# Patient Record
Sex: FEMALE | Race: WHITE | Employment: FULL TIME | URBAN - METROPOLITAN AREA
[De-identification: names, ages, dates, MRNs, and addresses within clinical notes are randomized per-mention and may not be internally consistent; named-entity substitution may affect disease eponyms.]

---

## 2017-04-24 ENCOUNTER — COMMUNICATION - HEALTHEAST (OUTPATIENT)
Dept: FAMILY MEDICINE | Facility: CLINIC | Age: 27
End: 2017-04-24

## 2017-04-24 ENCOUNTER — OFFICE VISIT - HEALTHEAST (OUTPATIENT)
Dept: FAMILY MEDICINE | Facility: CLINIC | Age: 27
End: 2017-04-24

## 2017-04-24 DIAGNOSIS — G43.009 MIGRAINE HEADACHE WITHOUT AURA: ICD-10-CM

## 2017-04-24 DIAGNOSIS — F32.9 MAJOR DEPRESSION: ICD-10-CM

## 2017-04-24 DIAGNOSIS — G90.511 COMPLEX REGIONAL PAIN SYNDROME TYPE 1 OF RIGHT UPPER EXTREMITY: ICD-10-CM

## 2017-04-24 ASSESSMENT — MIFFLIN-ST. JEOR: SCORE: 1587.33

## 2017-04-27 ENCOUNTER — COMMUNICATION - HEALTHEAST (OUTPATIENT)
Dept: FAMILY MEDICINE | Facility: CLINIC | Age: 27
End: 2017-04-27

## 2017-05-01 ENCOUNTER — COMMUNICATION - HEALTHEAST (OUTPATIENT)
Dept: FAMILY MEDICINE | Facility: CLINIC | Age: 27
End: 2017-05-01

## 2017-07-18 ENCOUNTER — COMMUNICATION - HEALTHEAST (OUTPATIENT)
Dept: FAMILY MEDICINE | Facility: CLINIC | Age: 27
End: 2017-07-18

## 2017-07-18 DIAGNOSIS — F41.9 ANXIETY AND DEPRESSION: ICD-10-CM

## 2017-07-18 DIAGNOSIS — F32.A ANXIETY AND DEPRESSION: ICD-10-CM

## 2017-07-20 ENCOUNTER — COMMUNICATION - HEALTHEAST (OUTPATIENT)
Dept: FAMILY MEDICINE | Facility: CLINIC | Age: 27
End: 2017-07-20

## 2017-08-21 ENCOUNTER — OFFICE VISIT - HEALTHEAST (OUTPATIENT)
Dept: FAMILY MEDICINE | Facility: CLINIC | Age: 27
End: 2017-08-21

## 2017-08-21 DIAGNOSIS — R19.7 DIARRHEA: ICD-10-CM

## 2017-08-21 DIAGNOSIS — G90.511 COMPLEX REGIONAL PAIN SYNDROME TYPE 1 OF RIGHT UPPER EXTREMITY: ICD-10-CM

## 2017-08-21 DIAGNOSIS — G43.009 MIGRAINE HEADACHE WITHOUT AURA: ICD-10-CM

## 2017-08-21 ASSESSMENT — MIFFLIN-ST. JEOR: SCORE: 1522.97

## 2017-08-24 ENCOUNTER — AMBULATORY - HEALTHEAST (OUTPATIENT)
Dept: LAB | Facility: CLINIC | Age: 27
End: 2017-08-24

## 2017-08-24 ENCOUNTER — COMMUNICATION - HEALTHEAST (OUTPATIENT)
Dept: FAMILY MEDICINE | Facility: CLINIC | Age: 27
End: 2017-08-24

## 2017-08-24 DIAGNOSIS — R19.7 DIARRHEA: ICD-10-CM

## 2017-11-23 ENCOUNTER — COMMUNICATION - HEALTHEAST (OUTPATIENT)
Dept: FAMILY MEDICINE | Facility: CLINIC | Age: 27
End: 2017-11-23

## 2017-11-23 ENCOUNTER — COMMUNICATION - HEALTHEAST (OUTPATIENT)
Dept: SCHEDULING | Facility: CLINIC | Age: 27
End: 2017-11-23

## 2017-11-23 DIAGNOSIS — F41.9 ANXIETY AND DEPRESSION: ICD-10-CM

## 2017-11-23 DIAGNOSIS — F32.A ANXIETY AND DEPRESSION: ICD-10-CM

## 2017-11-27 ENCOUNTER — COMMUNICATION - HEALTHEAST (OUTPATIENT)
Dept: FAMILY MEDICINE | Facility: CLINIC | Age: 27
End: 2017-11-27

## 2017-11-27 DIAGNOSIS — F32.A ANXIETY AND DEPRESSION: ICD-10-CM

## 2017-11-27 DIAGNOSIS — F41.9 ANXIETY AND DEPRESSION: ICD-10-CM

## 2018-08-20 ENCOUNTER — COMMUNICATION - HEALTHEAST (OUTPATIENT)
Dept: FAMILY MEDICINE | Facility: CLINIC | Age: 28
End: 2018-08-20

## 2018-08-20 DIAGNOSIS — G43.009 MIGRAINE WITHOUT AURA AND WITHOUT STATUS MIGRAINOSUS, NOT INTRACTABLE: ICD-10-CM

## 2018-08-23 ENCOUNTER — OFFICE VISIT - HEALTHEAST (OUTPATIENT)
Dept: FAMILY MEDICINE | Facility: CLINIC | Age: 28
End: 2018-08-23

## 2018-08-23 ENCOUNTER — COMMUNICATION - HEALTHEAST (OUTPATIENT)
Dept: SCHEDULING | Facility: CLINIC | Age: 28
End: 2018-08-23

## 2018-08-23 DIAGNOSIS — F32.A ANXIETY AND DEPRESSION: ICD-10-CM

## 2018-08-23 DIAGNOSIS — G43.009 MIGRAINE WITHOUT AURA AND WITHOUT STATUS MIGRAINOSUS, NOT INTRACTABLE: ICD-10-CM

## 2018-08-23 DIAGNOSIS — F41.9 ANXIETY AND DEPRESSION: ICD-10-CM

## 2018-08-23 DIAGNOSIS — G90.511 COMPLEX REGIONAL PAIN SYNDROME TYPE 1 OF RIGHT UPPER EXTREMITY: ICD-10-CM

## 2018-08-23 ASSESSMENT — MIFFLIN-ST. JEOR: SCORE: 1553.02

## 2018-12-14 ENCOUNTER — COMMUNICATION - HEALTHEAST (OUTPATIENT)
Dept: FAMILY MEDICINE | Facility: CLINIC | Age: 28
End: 2018-12-14

## 2018-12-14 DIAGNOSIS — F41.9 ANXIETY AND DEPRESSION: ICD-10-CM

## 2018-12-14 DIAGNOSIS — F32.A ANXIETY AND DEPRESSION: ICD-10-CM

## 2019-02-11 ENCOUNTER — COMMUNICATION - HEALTHEAST (OUTPATIENT)
Dept: FAMILY MEDICINE | Facility: CLINIC | Age: 29
End: 2019-02-11

## 2021-05-30 ENCOUNTER — RECORDS - HEALTHEAST (OUTPATIENT)
Dept: ADMINISTRATIVE | Facility: CLINIC | Age: 31
End: 2021-05-30

## 2021-05-30 VITALS — BODY MASS INDEX: 35 KG/M2 | WEIGHT: 197.56 LBS | HEIGHT: 63 IN

## 2021-05-31 VITALS — HEIGHT: 63 IN | BODY MASS INDEX: 32.49 KG/M2 | WEIGHT: 183.38 LBS

## 2021-06-01 VITALS — HEIGHT: 63 IN | WEIGHT: 190 LBS | BODY MASS INDEX: 33.66 KG/M2

## 2021-06-02 ENCOUNTER — RECORDS - HEALTHEAST (OUTPATIENT)
Dept: ADMINISTRATIVE | Facility: CLINIC | Age: 31
End: 2021-06-02

## 2021-06-10 NOTE — PROGRESS NOTES
OV    4/24/2017  Assessment:     1. Major depression     2. RSD upper limb, right     3. Migraine headache without aura        Plan:      We reviewed the etiology and natural history of depression/anxiety and options for treatment. She would like to resume  buproprion 150 mg as tolerated, and I also sent a new rx for 300 mg that she can take with her on her return to [facility]. We reviewed the potential side effects and indications for urgent evaluation. She will let me know if she has any significant side effects or concerns. I will also refill her tylenol #3, lidoderm patches and imitrex tabs. She apparently needs letters for her Rx's to bring to [facility]. She should f/u in 4-6 mos for recheck otherwise.    Subjective:         Flori Young is an 26 y.o. female who presents for follow up of of depression and anxiety. She has a history of RSD and depression, and stopped her wellbutrin and gabapentin a few mos ago. She has been attending [employer] School in [facility]. Her current symptoms began approximately 2 months ago, and symptoms have gradually worsened since that time.      Current symptoms include: anhedonia, insomnia, fatigue, feelings of worthlessness/guilt, difficulty concentrating and hopelessness.  Patient denies recurrent thoughts of death and suicidal thoughts with specific plan and denies panic attacks.      Current treatment for depression: None. She complains of the following side effects from the treatment: none. 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 Previous treatment modalities employed include Individual therapy and Medication.      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 [facility] for [employer].      She would like to resume Wellbutrin, tylenol with codeine, lidoderm patches, imitrex.    Little interest or pleasure in doing things: More than half the days  Feeling down, depressed, or hopeless:  "Several days  Trouble falling or staying asleep, or sleeping too much: Nearly every day  Feeling tired or having little energy: Nearly every day  Poor appetite or overeating: More than half the days  Feeling bad about yourself - or that you are a failure or have let yourself or your family down: More than half the days  Trouble concentrating on things, such as reading the newspaper or watching television: Several days  Moving or speaking so slowly that other people could have noticed. Or the opposite - being so fidgety or restless that you have been moving around a lot more than usual: Several days  Thoughts that you would be better off dead, or of hurting yourself in some way: More than half the days  PHQ-9 Total Score: 17  If you checked off any problems, how difficult have these problems made it for you to do your work, take care of things at home, or get along with other people?: Very difficult        Review of Systems  Pertinent items are noted in HPI.     Objective:     /72  Pulse 72  Ht 5' 2.5\" (1.588 m)  Wt 197 lb 9 oz (89.6 kg)  BMI 35.56 kg/m2  Physical Exam:  GEN: Alert and oriented, NAD,  well nourished  SKIN:  Normal skin turgor, no lesions/rashes   HEENT: moist mucous membranes, no rhinorrhea.    NECK: Normal.  No adenopathy or thyromegaly.  CV: Regular rate and rhythm, no murmurs.   LUNGS: Clear to auscultation bilaterally.    ABDOMEN: Soft, non-tender, non-distended, no masses   EXTREMITY: No edema, cyanosis  NEURO: Grossly normal.     Mental Status Examination:  Dress, grooming, personal hygiene: Appropriate  Speech: Appropriate  Mood: Appropriate           "

## 2021-06-12 NOTE — PROGRESS NOTES
Assessment:        1. 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 Migraine headache without aura  ibuprofen (ADVIL,MOTRIN) 400 MG tablet    tiZANidine (ZANAFLEX) 2 MG tablet   4. Diarrhea  Ova and Parasite, Stool    C. difficile Toxigenic by PCR    Culture, Stool        Plan:         We reviewed the treatment options for her shoulder symptoms and we will continue her same medications at this time, with the addition of tizanidine as needed. We reviewed potential side effects at length, including possible sedation, dizziness, etc, and she will let me know if she has any significant difficulties. We can send a new Rx for cyclobenzaprine if needed. As far as her recent diarrhea, we will send stool studies as noted and contact her with those results. She will otherwise call or return to clinic with any ongoing or worsening symptoms, and f/u in  4-6 mos for routine med check/evaluation.     Subjective:            Flori Young is a 26 y.o. female who presents for follow up of right shoulder pain and complex regional pain syndrome following multiple surgeries for Erb's Palsy. She has had increased pain recently with some radiation of pain down the ulnar distribution and into the right thumb. She notes that when pain is particularly bad, she experiences bruising in the axilla. Pain is in the right shoulder and trapezius area. She has been taking tylenol #3 a couple times weekly, and she uses a TENS unit intermittently. She also reports more GI symptoms recently with nausea and diarrhea and constipation alternating. She denies any vomiting or fever. She's previously been on gabapentin and cymbalta but got depressed with the gabapentin. She also had issues with seratonin syndrome in the past so is reluctant to take too many things at once. She has been on cyclobenzaprine in the  "past but it does cause some sedation. She is doing fairly well on wellbutrin currently for mood. She has migraines every few weeks.          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 She tries to get home every semester.     Review of Systems  Pertinent items are noted in HPI.     Objective:     /76  Pulse 64  Ht 5' 2.5\" (1.588 m)  Wt 183 lb 6 oz (83.2 kg)  BMI 33.01 kg/m2  GEN: Alert and oriented, NAD, well nourished  SKIN:  Normal skin turgor, no lesions/rashes   HEENT: NC/AT, moist mucous membranes, no rhinorrhea.    NECK: Normal.  No adenopathy or thyromegaly.  CV: Regular rate and rhythm, no murmurs.   LUNGS: Clear to auscultation bilaterally.    BACK: Normal  EXTREMITY: No edema, cyanosis  NEURO: Grossly normal.      "

## 2021-06-20 NOTE — PROGRESS NOTES
Assessment:     1. Anxiety and depression  buPROPion (WELLBUTRIN XL) 300 MG 24 hr tablet   2. Migraine without aura and without status migrainosus, not intractable  SUMAtriptan (IMITREX) 50 MG tablet    tiZANidine (ZANAFLEX) 2 MG tablet   3. Complex regional pain syndrome type 1 of right upper extremity  acetaminophen-codeine (TYLENOL #3) 300-30 mg per tablet    tiZANidine (ZANAFLEX) 2 MG tablet          Plan:            We reviewed the treatment options for her shoulder pain and migraine symptoms and we will continue the tylenol #3 intermittently and tizanidine prn. We reviewed use of OTC analgesics as well as increased fluids and rest, and she will call or return to clinic with any ongoing or worsening symptoms. For her mood and anxiety symptoms, we will continue the wellbutrin as directed. She will otherwise will f/u in  4-6 mos for routine med check/evaluation.       Subjective:         Headaches   Flori Young is a 27 y.o. female who presents for follow-up of migraine headaches. Headaches are occurring once or twice per month. Generally, the headaches last about several hours. Home treatment has included Imitrex oral with some improvement. Work attendance or other daily activities are affected by the headaches. The patient denies loss of balance, speech difficulties and vision problems. Typical triggers are extreme fatigue and bright lighting.         She has a history of CRPS of the right shoulder pain which has been stable. She uses tizanidine and lidoderm and intermittent tylenol #3. She was on gabapentin and stopped it due to side effects, but has not seen much change since then. She has been attending [employer]inary School in [facility] [facility] and will be returning there for the school year.    The following portions of the patient's history were reviewed and updated as appropriate: allergies, current medications, past family history, past medical history, past social history, past surgical history and problem  "list.    Review of Systems  A 12 point comprehensive review of systems was negative except as noted.      Objective:        Vitals:    08/23/18 0800   BP: 126/70   Pulse: 96   Weight: 190 lb (86.2 kg)   Height: 5' 2.5\" (1.588 m)      Physical Exam:  GEN: Alert and oriented, NAD,  well nourished  SKIN:  Normal skin turgor, no lesions/rashes   HEENT: moist mucous membranes, no rhinorrhea.    NECK: Normal.  No adenopathy or thyromegaly.  CV: Regular rate and rhythm, no murmurs.   LUNGS: Clear to auscultation bilaterally.    ABDOMEN: Soft, non-tender, non-distended, no masses   EXTREMITY: No edema, cyanosis  NEURO: Grossly normal.           "

## 2021-06-23 NOTE — TELEPHONE ENCOUNTER
FYI - Status Update  Who is Calling: Parent  Update: Patient's mother calling stating that the patient's been getting appointment reminders to have a follow up visit.  Patient mother would like to inform the clinic that this patient will be out of the country until mid to late April.  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  Okay to leave a detailed message?:  No return call needed

## 2021-09-11 ENCOUNTER — HEALTH MAINTENANCE LETTER (OUTPATIENT)
Age: 31
End: 2021-09-11

## 2022-01-27 ENCOUNTER — OFFICE VISIT (OUTPATIENT)
Dept: FAMILY MEDICINE | Facility: CLINIC | Age: 32
End: 2022-01-27
Payer: COMMERCIAL

## 2022-01-27 VITALS
HEART RATE: 99 BPM | OXYGEN SATURATION: 99 % | DIASTOLIC BLOOD PRESSURE: 82 MMHG | SYSTOLIC BLOOD PRESSURE: 132 MMHG | BODY MASS INDEX: 42.93 KG/M2 | WEIGHT: 242.31 LBS | HEIGHT: 63 IN

## 2022-01-27 DIAGNOSIS — N64.4 BREAST PAIN: ICD-10-CM

## 2022-01-27 DIAGNOSIS — N64.52 BREAST DISCHARGE: Primary | ICD-10-CM

## 2022-01-27 PROCEDURE — 99203 OFFICE O/P NEW LOW 30 MIN: CPT | Performed by: FAMILY MEDICINE

## 2022-01-27 RX ORDER — LIDOCAINE 50 MG/G
1 PATCH TOPICAL
COMMUNITY
End: 2024-03-05

## 2022-01-27 ASSESSMENT — MIFFLIN-ST. JEOR: SCORE: 1785.31

## 2022-01-27 NOTE — PROGRESS NOTES
"  Assessment:       ICD-10-CM    1. Breast discharge  N64.52 US Breast Left Limited 1-3 Quadrants   2. Breast pain  N64.4 US Breast Left Limited 1-3 Quadrants          Plan:      We reviewed the likely/potential etiology(ies) for her breast symptoms and we will proceed with   and cover with an ultrasound of the left breast to rule out pathology. We reviewed indications for urgent evaluation, and she will call or return to clinic with any ongoing or worsening symptoms.             Subjective:          Flori Young is an 31 year old female who presents for evaluation of breast pain and discharge. Change was noted 1 month ago, and has been stable since first identified. Patient does not routinely do self breast exams. There is tenderness behind the nipple with some mild swelling, but there is no distinct mass. Patient reports some nipple discharge that is green-purple in color and scant. Veins seem enlarged. No redness or swelling in skin. No fever or chills, no myalgias.      Breast cancer risk factors include: none.        The following portions of the patient's history were reviewed and updated as appropriate: allergies, current medications, past family history, past medical history, past social history, past surgical history and problem list.    Review of Systems  12 point ROS negative except as noted above      Objective:   /82 (Patient Position: Sitting, Cuff Size: Adult Large)   Pulse 99   Ht 1.588 m (5' 2.5\")   Wt 109.9 kg (242 lb 5 oz)   LMP 01/18/2022 (Approximate)   SpO2 99%   BMI 43.61 kg/m         GEN: Alert and oriented, NAD, well nourished  SKIN:  Normal skin turgor, no lesions/rashes   HEENT: NC/AT, moist mucous membranes.    NECK: Normal.    CV: Regular rate and rhythm.   LUNGS: Clear. Normal respirations.   BREASTS: there are no palpable abnormalities on either side, unable to elicit discharge from the left nipple.  EXTREMITY: No edema, cyanosis  NEURO: Grossly normal.         "

## 2022-02-01 ENCOUNTER — HOSPITAL ENCOUNTER (OUTPATIENT)
Dept: ULTRASOUND IMAGING | Facility: CLINIC | Age: 32
End: 2022-02-01
Attending: FAMILY MEDICINE
Payer: COMMERCIAL

## 2022-02-01 DIAGNOSIS — N64.52 BREAST DISCHARGE: ICD-10-CM

## 2022-02-01 DIAGNOSIS — N64.4 BREAST PAIN: ICD-10-CM

## 2022-02-01 PROCEDURE — 76642 ULTRASOUND BREAST LIMITED: CPT | Mod: LT

## 2022-03-11 ENCOUNTER — NURSE TRIAGE (OUTPATIENT)
Dept: NURSING | Facility: CLINIC | Age: 32
End: 2022-03-11
Payer: COMMERCIAL

## 2022-03-11 ENCOUNTER — OFFICE VISIT (OUTPATIENT)
Dept: FAMILY MEDICINE | Facility: CLINIC | Age: 32
End: 2022-03-11
Payer: COMMERCIAL

## 2022-03-11 VITALS
TEMPERATURE: 98.3 F | DIASTOLIC BLOOD PRESSURE: 107 MMHG | RESPIRATION RATE: 16 BRPM | OXYGEN SATURATION: 98 % | HEART RATE: 83 BPM | SYSTOLIC BLOOD PRESSURE: 159 MMHG | BODY MASS INDEX: 45.12 KG/M2 | WEIGHT: 250.7 LBS

## 2022-03-11 DIAGNOSIS — S05.02XA ABRASION OF LEFT CORNEA, INITIAL ENCOUNTER: Primary | ICD-10-CM

## 2022-03-11 PROCEDURE — 99213 OFFICE O/P EST LOW 20 MIN: CPT | Performed by: EMERGENCY MEDICINE

## 2022-03-11 RX ORDER — ERYTHROMYCIN 5 MG/G
0.5 OINTMENT OPHTHALMIC 4 TIMES DAILY
Qty: 3.5 G | Refills: 0 | Status: SHIPPED | OUTPATIENT
Start: 2022-03-11 | End: 2024-03-05

## 2022-03-11 RX ORDER — ERYTHROMYCIN 5 MG/G
0.5 OINTMENT OPHTHALMIC 4 TIMES DAILY
Qty: 3.5 G | Refills: 0 | Status: SHIPPED | OUTPATIENT
Start: 2022-03-11 | End: 2022-03-11

## 2022-03-11 NOTE — TELEPHONE ENCOUNTER
Pt called in states she feel something in her eye.  it is the left eye.  The symptom started this morning.  Pt has eye pain.  The pain is 3-4/10 on the scale.  The Pt has irration also.  Pt wear contact lens.  Has blurred vision.  Pt is not pregnant.  The disposition is to go to ED.  Care advice given per protocol.  Patient agrees with care advice given.   Agreed to call back if he has additional symptoms or questions.    Jeremiah [place] Nurse Advisor 3/11/2022 3:47 PM      Reason for Disposition    Eye has been washed out > 30 minutes ago and still feels like FB is still present    Additional Information    Negative: Doesn't sound like FB in the eye    Negative: Foreign body is a chemical    Negative: Foreign body (FB) hit eye at high speed (e.g., small metallic chip from hammering, lawnmower, BB gun, explosion)    Negative: Sharp FB (even if FB was removed) and any pain present now    Negative: Foreign body (FB) stuck on eyeball    Protocols used: EYE - FOREIGN BODY-A-OH

## 2022-03-12 NOTE — PROGRESS NOTES
Impression:  Corneal abrasion left eye    Plan:  Keep the contact lens out, ibuprofen or Tylenol for pain, erythromycin ointment to the left eye 4 times daily, follow-up with [facility] on Monday if the pain persists      Chief Complaint:  Patient presents with:  Ear Problem: left eye red, painful started today, getting progressively worse during the day         HPI:   Flori Young is a 31 year old female who presents to this clinic for the evaluation of left eye pain.  Patient noted some pain when she put the contact lens in her left eye this morning.  The pain became worse and after an hour or 2 she took the contact lens out and it was very painful when she took it out.  Since that time she has had pain in the eye, blurry vision, and photophobia.  No other complaints.  The blurry vision is cleared up now that she took the contact lens out      PMH:   No past medical history on file.  Past Surgical History:   Procedure Laterality Date     ARTHROSCOPY KNEE WITH MENISCAL REPAIR Left 07/2007     ARTHROSCOPY KNEE WITH MENISCAL REPAIR Left 06/2008    Revision     ARTHROSCOPY KNEE WITH MENISCAL REPAIR Right 08/2010     ARTHROSCOPY SHOULDER DECOMPRESSION  4/22/2014    Procedure: ARTHROSCOPY SHOULDER DECOMPRESSION;  Surgeon: Shruti Moreno MD;  [facility]     BICEPS TENOTOMY  4/22/2014    Procedure: BICEPS TENOTOMY;  Surgeon: Shruti Moreno MD;  Location: [facility]     ORTHOPEDIC SURGERY       VT ESOPHAGOGASTRODUODENOSCOPY TRANSORAL DIAGNOSTIC      Description: Esophagogastroduodenoscopy;  Recorded: 08/30/2012;  Comments: ABDOMINAL PAIN, MILD DIFFUSE GASTRITIS NOTED, NO ULCERATIONS     SHOULDER SURGERY      Multiple for Erb's Palsy         ROS:  All other systems negative    Meds:    Current Outpatient Medications:      erythromycin (ROMYCIN) 5 MG/GM ophthalmic ointment, Place 0.5 inches Into the left eye 4 times daily, Disp: 3.5 g, Rfl: 0     ibuprofen (IBUPROFEN) 200 MG tablet, Take 600  mg by mouth every 4 hours as needed for mild pain  (Patient not taking: Reported on 3/11/2022), Disp: , Rfl:      lidocaine (LIDODERM) 5 % patch, Place 1 patch onto the skin (Patient not taking: Reported on 3/11/2022), Disp: , Rfl:      melatonin 1 MG TABS tablet, Take 3 mg by mouth (Patient not taking: Reported on 3/11/2022), Disp: , Rfl:      SUMAtriptan (IMITREX) 50 MG tablet, Take by mouth at onset of headache (Patient not taking: Reported on 3/11/2022), Disp: , Rfl:      UNABLE TO FIND, MEDICATION NAME: Migraine relief- Homeopathic [place] (Patient not taking: Reported on 3/11/2022), Disp: , Rfl:         Social:  Social History     Socioeconomic History     Marital status: Single     Spouse name: Not on file     Number of children: Not on file     Years of education: Not on file     Highest education level: Not on file   Occupational History     Not on file   Tobacco Use     Smoking status: Never Smoker     Smokeless tobacco: Never Used   Substance and Sexual Activity     Alcohol use: No     Drug use: No     Sexual activity: Not on file   Other Topics Concern     Not on file   Social History Narrative     Not on file     Social Determinants of Health     Financial Resource Strain: Not on file   Food Insecurity: Not on file   Transportation Needs: Not on file   Physical Activity: Not on file   Stress: Not on file   Social Connections: Not on file   Intimate Partner Violence: Not on file   Housing Stability: Not on file         Physical Exam:  Vitals:    03/11/22 1754 03/11/22 1759   BP: (!) 148/82 (!) 159/107   BP Location: Right arm Left arm   Patient Position: Sitting Sitting   Cuff Size: Adult Large Adult Large   Pulse: 83    Resp: 16    Temp: 98.3  F (36.8  C)    TempSrc: Oral    SpO2: 98%    Weight: 113.7 kg (250 lb 11.2 oz)       Patient is awake, alert, no distress  Eyes: PERRL, EOMI, left eye shows some conjunctival injection.  Fluorescein stain and Woods lamp examination shows a small corneal abrasion  on the left cornea, no foreign body, no corneal ulceration, anterior chambers clear  Head: Atraumatic and normocephalic  Neuro: Normal motor and sensory function in all extremities  Psych: Awake, alert, normally responsive      Results:    No results found for this or any previous visit (from the past 24 hour(s)).           Vinnie Mc MD

## 2022-03-12 NOTE — PATIENT INSTRUCTIONS
Patient Education     Corneal Abrasion    You have a scratch or scrape (abrasion) on your cornea. The cornea is the clear part in the front of the eye. This sensitive area is very painful when injured. You may make tears frequently, and your vision may be blurry until the injury heals. You may be sensitive to light.   This part of the body heals quickly. You can expect the pain to go away within 24 to 48 hours. If the abrasion is large or deep, your doctor may apply an eye patch, although this is not always done. An antibiotic ointment or eye drops may also be used to prevent infection.   Numbing drops may be used to relieve the pain temporarily so that your eyes can be examined. But these drops can t be prescribed for home use because that would prevent healing and lead to more serious problems. Also, if you can t feel your eye, there is a chance of accidentally injuring it further without knowing it.   Home care    A cold pack may be applied over the eye (or eye patch) for 20 minutes at a time, to reduce pain. To make a cold pack, put ice cubes in a plastic bag that seals at the top. Wrap the bag in a clean, thin towel or cloth.    You may use acetaminophen or ibuprofen to control pain, unless another pain medicine was prescribed. If you have chronic liver or kidney disease, talk with your healthcare provider before using these medicines. Also talk with your provider if you have ever had a stomach ulcer or gastrointestinal bleeding.    Rest your eyes and don t read until symptoms are gone.    If you use contact lenses, don t wear them until all symptoms are gone.    If your vision is affected by the corneal abrasion or if an eye patch was applied, don t drive a motor vehicle or operate machinery until all symptoms are gone. You may have trouble judging distances using only one eye.    If your eyes are sensitive to light, try wearing sunglasses, or stay indoors until symptoms go away.    Follow-up care  Follow up  with your healthcare provider, or as advised.    If no patch was put on your eye and the pain continues for more than 48 hours, you should have another exam. Contact your healthcare provider to arrange this.    If your eye was patched and you were asked to remove the patch yourself, see your healthcare provider. Contact your healthcare provider if you still have pain after the patch is removed.    If you were given a return appointment for patch removal and re-examination, be sure to keep the appointment. Leaving the patch in place longer than advised could be harmful.  When to seek medical advice  Call your healthcare provider right away if any of these occur.    Eye pain gets worse or does not get better after 24 hours    Discharge from the eye    Redness of the eye or swelling of the eyelids gets worse    Vision gets worse    Symptoms get worse after the abrasion has healed  Pooja last reviewed this educational content on 2/1/2020 2000-2021 [employer]. All rights reserved. This information is not intended as a substitute for professional medical care. Always follow your healthcare professional's instructions.

## 2022-10-29 ENCOUNTER — HEALTH MAINTENANCE LETTER (OUTPATIENT)
Age: 32
End: 2022-10-29

## 2022-12-05 ENCOUNTER — ANCILLARY PROCEDURE (OUTPATIENT)
Dept: GENERAL RADIOLOGY | Facility: CLINIC | Age: 32
End: 2022-12-05
Attending: PHYSICIAN ASSISTANT
Payer: COMMERCIAL

## 2022-12-05 ENCOUNTER — OFFICE VISIT (OUTPATIENT)
Dept: FAMILY MEDICINE | Facility: CLINIC | Age: 32
End: 2022-12-05
Payer: COMMERCIAL

## 2022-12-05 VITALS
RESPIRATION RATE: 16 BRPM | WEIGHT: 259 LBS | BODY MASS INDEX: 46.62 KG/M2 | TEMPERATURE: 98.4 F | DIASTOLIC BLOOD PRESSURE: 85 MMHG | HEART RATE: 92 BPM | OXYGEN SATURATION: 99 % | SYSTOLIC BLOOD PRESSURE: 155 MMHG

## 2022-12-05 DIAGNOSIS — R07.89 SENSATION OF CHEST TIGHTNESS: Primary | ICD-10-CM

## 2022-12-05 DIAGNOSIS — R07.89 SENSATION OF CHEST TIGHTNESS: ICD-10-CM

## 2022-12-05 LAB
D DIMER PPP FEU-MCNC: <=0.27 UG/ML FEU (ref 0–0.5)
ERYTHROCYTE [DISTWIDTH] IN BLOOD BY AUTOMATED COUNT: 12.5 % (ref 10–15)
FLUAV AG SPEC QL IA: NEGATIVE
FLUBV AG SPEC QL IA: NEGATIVE
HCT VFR BLD AUTO: 44.4 % (ref 35–47)
HGB BLD-MCNC: 14.5 G/DL (ref 11.7–15.7)
MCH RBC QN AUTO: 26 PG (ref 26.5–33)
MCHC RBC AUTO-ENTMCNC: 32.7 G/DL (ref 31.5–36.5)
MCV RBC AUTO: 80 FL (ref 78–100)
PLATELET # BLD AUTO: 281 10E3/UL (ref 150–450)
RBC # BLD AUTO: 5.58 10E6/UL (ref 3.8–5.2)
TROPONIN I SERPL-MCNC: <0.01 NG/ML (ref 0–0.29)
WBC # BLD AUTO: 9.1 10E3/UL (ref 4–11)

## 2022-12-05 PROCEDURE — 99214 OFFICE O/P EST MOD 30 MIN: CPT | Performed by: PHYSICIAN ASSISTANT

## 2022-12-05 PROCEDURE — 84484 ASSAY OF TROPONIN QUANT: CPT | Performed by: PHYSICIAN ASSISTANT

## 2022-12-05 PROCEDURE — 36415 COLL VENOUS BLD VENIPUNCTURE: CPT | Performed by: PHYSICIAN ASSISTANT

## 2022-12-05 PROCEDURE — 71046 X-RAY EXAM CHEST 2 VIEWS: CPT | Mod: TC | Performed by: RADIOLOGY

## 2022-12-05 PROCEDURE — 93010 ELECTROCARDIOGRAM REPORT: CPT | Performed by: INTERNAL MEDICINE

## 2022-12-05 PROCEDURE — 85379 FIBRIN DEGRADATION QUANT: CPT | Performed by: PHYSICIAN ASSISTANT

## 2022-12-05 PROCEDURE — 93005 ELECTROCARDIOGRAM TRACING: CPT | Performed by: PHYSICIAN ASSISTANT

## 2022-12-05 PROCEDURE — 87804 INFLUENZA ASSAY W/OPTIC: CPT | Performed by: PHYSICIAN ASSISTANT

## 2022-12-05 PROCEDURE — 85027 COMPLETE CBC AUTOMATED: CPT | Performed by: PHYSICIAN ASSISTANT

## 2022-12-05 ASSESSMENT — ENCOUNTER SYMPTOMS
FEVER: 0
WHEEZING: 0
SHORTNESS OF BREATH: 1
CHEST TIGHTNESS: 1
ABDOMINAL PAIN: 0
CHILLS: 1

## 2022-12-05 NOTE — PROGRESS NOTES
Patient presents with:  Headache: Has had headache for several days   Chest Pain: Had chest tightness pain after shoveling yesterday and is SOB had negative COVID test yesterday      Clinical Decision Making:  Patient experiencing chest tightness and shortness of breath.  Lungs are CTA.  Chest x-ray is negative for any signs of pneumonia, pleural effusion, pericardial effusion.  EKG is negative for any signs of ischemic changes or arrhythmias.  Troponin was normal today.  D-dimer is within normal range and PE is ruled out today.  Influenza test is negative.  At home COVID test was negative.  CBC is negative for any signs of anemia or significant infection.  Recommend watchful waiting along with Tylenol for discomfort.  I did not feel that albuterol be helpful in this scenario as the patient was not wheezy and has normal oxygen saturation.  Consider the possibility for gastritis causing shortness of breath.  Patient will consider trying acid reducing medicine and bland diet.      ICD-10-CM    1. Sensation of chest tightness  R07.89 D dimer, quantitative     Influenza A & B Antigen - Clinic Collect     CBC with platelets     XR Chest 2 Views     EKG 12-lead, tracing only     Troponin I     D dimer, quantitative     CBC with platelets     Troponin I          There are no Patient Instructions on file for this visit.    HPI:  Flori Young is a 32 year old female who presents today complaining of patient has been experiencing headache for the past few days.  Yesterday patient was shoveling and she experienced chest tightness afterwards.  Patient is now experiencing shortness of breath.  Patient had negative at-home COVID test yesterday.    History obtained from the patient.    Problem List:  2013-12: Pain in joint, shoulder region  2013-12: Brachial plexus injury  2011-11: Right shoulder pain  2009-02: Tear of medial cartilage or meniscus of knee, current      No past medical history on file.    Social History      Tobacco Use     Smoking status: Never     Smokeless tobacco: Never   Substance Use Topics     Alcohol use: No       Review of Systems   Constitutional: Positive for chills. Negative for fever.   Respiratory: Positive for chest tightness and shortness of breath. Negative for wheezing.    Cardiovascular: Positive for chest pain (Just to the right of the sternum).   Gastrointestinal: Negative for abdominal pain.       Vitals:    12/05/22 1612   BP: (!) 155/85   Pulse: 92   Resp: 16   Temp: 98.4  F (36.9  C)   TempSrc: Oral   SpO2: 99%   Weight: 117.5 kg (259 lb)       Physical Exam  Vitals and nursing note reviewed.   Constitutional:       General: She is not in acute distress.     Appearance: She is not toxic-appearing or diaphoretic.   HENT:      Head: Normocephalic and atraumatic.      Right Ear: External ear normal.      Left Ear: External ear normal.   Eyes:      Conjunctiva/sclera: Conjunctivae normal.   Cardiovascular:      Rate and Rhythm: Normal rate and regular rhythm.      Heart sounds: No murmur heard.  Pulmonary:      Effort: Pulmonary effort is normal. No respiratory distress.      Breath sounds: No stridor. No wheezing, rhonchi or rales.   Neurological:      Mental Status: She is alert.   Psychiatric:         Mood and Affect: Mood normal.         Behavior: Behavior normal.         Thought Content: Thought content normal.         Judgment: Judgment normal.         Results:  Results for orders placed or performed in visit on 12/05/22   XR Chest 2 Views     Status: None    Narrative    EXAM: XR CHEST 2 VIEWS  LOCATION: [facility]  DATE/TIME: 12/5/2022 5:45 PM    INDICATION: chest tightness, SOB, mild chills. Lungs CTA. Pain just to the right of sternum.  COMPARISON: None.      Impression    IMPRESSION: Lungs are clear. No pleural effusion. Heart size and pulmonary vascularity within normal limits.   Results for orders placed or performed in visit on 12/05/22   D dimer,  quantitative     Status: Normal   Result Value Ref Range    D-Dimer Quantitative <=0.27 0.00 - 0.50 ug/mL FEU    Narrative    This D-dimer assay is intended for use in conjunction with a clinical pretest probability assessment model to exclude pulmonary embolism (PE) and deep venous thrombosis (DVT) in outpatients suspected of PE or DVT. The cut-off value is 0.50 ug/mL FEU.   CBC with platelets     Status: Abnormal   Result Value Ref Range    WBC Count 9.1 4.0 - 11.0 10e3/uL    RBC Count 5.58 (H) 3.80 - 5.20 10e6/uL    Hemoglobin 14.5 11.7 - 15.7 g/dL    Hematocrit 44.4 35.0 - 47.0 %    MCV 80 78 - 100 fL    MCH 26.0 (L) 26.5 - 33.0 pg    MCHC 32.7 31.5 - 36.5 g/dL    RDW 12.5 10.0 - 15.0 %    Platelet Count 281 150 - 450 10e3/uL   Troponin I     Status: Normal   Result Value Ref Range    Troponin I <0.01 0.00 - 0.29 ng/mL   EKG 12-lead, tracing only     Status: None (Preliminary result)   Result Value Ref Range    Systolic Blood Pressure  mmHg    Diastolic Blood Pressure  mmHg    Ventricular Rate 84 BPM    Atrial Rate 84 BPM    MO Interval 128 ms    QRS Duration 82 ms     ms    QTc 437 ms    P Axis 44 degrees    R AXIS 50 degrees    T Axis 28 degrees    Interpretation ECG       Sinus rhythm  Nonspecific ST abnormality  Abnormal ECG  No previous ECGs available     Influenza A & B Antigen - Clinic Collect     Status: Normal    Specimen: Nose; Swab   Result Value Ref Range    Influenza A antigen Negative Negative    Influenza B antigen Negative Negative    Narrative    Test results must be correlated with clinical data. If necessary, results should be confirmed by a molecular assay or viral culture.         At the end of the encounter, I discussed results, diagnosis, medications. Discussed red flags for immediate return to clinic/ER, as well as indications for follow up if no improvement. Patient understood and agreed to plan. Patient was stable for discharge.

## 2022-12-06 LAB
ATRIAL RATE - MUSE: 84 BPM
DIASTOLIC BLOOD PRESSURE - MUSE: NORMAL MMHG
INTERPRETATION ECG - MUSE: NORMAL
P AXIS - MUSE: 44 DEGREES
PR INTERVAL - MUSE: 128 MS
QRS DURATION - MUSE: 82 MS
QT - MUSE: 370 MS
QTC - MUSE: 437 MS
R AXIS - MUSE: 50 DEGREES
SYSTOLIC BLOOD PRESSURE - MUSE: NORMAL MMHG
T AXIS - MUSE: 28 DEGREES
VENTRICULAR RATE- MUSE: 84 BPM

## 2023-11-12 ENCOUNTER — HEALTH MAINTENANCE LETTER (OUTPATIENT)
Age: 33
End: 2023-11-12

## 2024-03-05 ENCOUNTER — OFFICE VISIT (OUTPATIENT)
Dept: FAMILY MEDICINE | Facility: CLINIC | Age: 34
End: 2024-03-05
Payer: COMMERCIAL

## 2024-03-05 VITALS
DIASTOLIC BLOOD PRESSURE: 89 MMHG | BODY MASS INDEX: 43.38 KG/M2 | RESPIRATION RATE: 18 BRPM | OXYGEN SATURATION: 98 % | SYSTOLIC BLOOD PRESSURE: 129 MMHG | WEIGHT: 241 LBS | TEMPERATURE: 100 F | HEART RATE: 111 BPM

## 2024-03-05 DIAGNOSIS — B97.89 VIRAL LARYNGITIS: ICD-10-CM

## 2024-03-05 DIAGNOSIS — J06.9 VIRAL URI WITH COUGH: Primary | ICD-10-CM

## 2024-03-05 DIAGNOSIS — J04.0 VIRAL LARYNGITIS: ICD-10-CM

## 2024-03-05 LAB
FLUAV AG SPEC QL IA: NEGATIVE
FLUBV AG SPEC QL IA: POSITIVE

## 2024-03-05 PROCEDURE — 87804 INFLUENZA ASSAY W/OPTIC: CPT | Performed by: STUDENT IN AN ORGANIZED HEALTH CARE EDUCATION/TRAINING PROGRAM

## 2024-03-05 PROCEDURE — 99213 OFFICE O/P EST LOW 20 MIN: CPT | Performed by: STUDENT IN AN ORGANIZED HEALTH CARE EDUCATION/TRAINING PROGRAM

## 2024-03-05 NOTE — PATIENT INSTRUCTIONS
Your constellation of symptoms are most consistent with a viral illness. We will test you for the flu.     Continue supportive cares including tylenol and ibuprofen as needed, drinking lots of fluids, and resting. You can try saline nasal spray as needed for congestion and honey to help with cough and sore throat. You can also try over the counter cough medications to see if they help.    Please return to the clinic or visit the emergency room if you have worsening symptoms, if you develop difficulty breathing or shortness of breath, if you develop fatigue/lethargy, or if you have concerns about dehydration.     Follow up with your primary care provider as needed or if your symptoms do not improve within the next 10 to 14 days.

## 2024-03-05 NOTE — PROGRESS NOTES
Urgent Care - 03/05/2024      HPI:      Flori Young is a 33 year old female here for evaluation of fever, sore throat, hoarse voice, and cough.    Woke up with a sore throat and hoarse voice yesterday. Hurts to swallow. Had a fever this AM of 100.6, which is why she came in today. Able to drink fluids okay. Has cough and congestion, but not coughing up any phlegm. Having myalgias. No vomiting or diarrhea. No ear pain.     There are multiple people out sick from work. No difficulty breathing or chest pain.      Review of Systems  Complete ROS negative unless noted in the HPI above.     Allergies  Allergies   Allergen Reactions    Bactrim [Sulfamethoxazole W-Trimethoprim]     Hydromorphone     Percocet [Oxycodone-Acetaminophen] Nausea     Outpatient Medications  SUMAtriptan (IMITREX) 50 MG tablet, Take by mouth at onset of headache (Patient not taking: Reported on 3/11/2022)    No current facility-administered medications on file prior to visit.    Past Medical History  Patient Active Problem List   Diagnosis    Pain in joint, shoulder region    Brachial plexus injury       Family History  Family History   Problem Relation Age of Onset    Hypertension Mother     Nephrolithiasis Mother     No Known Problems Father     No Known Problems Sister        Social History  Social History     Tobacco Use    Smoking status: Never    Smokeless tobacco: Never   Substance Use Topics    Alcohol use: No        Objective:      /89   Pulse 111   Temp 100  F (37.8  C)   Resp 18   Wt 109.3 kg (241 lb)   SpO2 98%   BMI 43.38 kg/m      General: well-appearing, in no acute distress.  HEENT: NC/AT, MMM, BL TM grey with positive light reflex. + hoarse voice. + oropharyngeal erythema without swollen tonsils or exudate. No post-nasal drip.  CVS: Slightly tachycardic, RR. No murmurs, rubs, or gallops.   Resp: CTAB, no wheezes or crackles. + Cough.   Abd: Soft/non-distended, non-tender to palpation.  Skin: no rash on exposed  skin  Neuro: alert and oriented, no gross neurological deficits     Lab & Imaging Results    No results found for this or any previous visit (from the past 24 hour(s)).    I personally reviewed these results and discussed findings with the patient.      Assessment & Plan:   Flori Young is a 33 year old yo female, who presented with fever, cough, congestion, and sore throat/hoarse voice, consisted with Viral URI with laryngitis.     Mildly tachycardic (regular rhythm), but other vitals are within normal limits. Patient is well appearing, appears hydrated, and is breathing comfortably and maintaining saturations on room air. No focal findings on exam to suggest a pneumonia. No evidence of AOM. Home COVID test negative, Flu testing pending. Will plan to continued supportive cares, tylenol and ibuprofen as needed for pain or fever, drinking lots of fluids, honey for cough, and voice rest. Advised they should return to the clinic or present to the ER with worsening shortness of breath, feeling of throat swelling, lethargy/fatigue, worsening fevers, as well as difficulty maintaining hydration. Patient stated understanding of these recommendations.     Viral URI with cough  Viral laryngitis  -     Influenza A/B antigen    Patient to follow up with PCP in 5-7 days if not getting better.     Discussed the above with the patient, who stated understanding and agreed with the plan.     India Hassan MD  Internal Medicine and Pediatrics   Urgent Care

## 2024-12-28 ENCOUNTER — HEALTH MAINTENANCE LETTER (OUTPATIENT)
Age: 34
End: 2024-12-28